# Patient Record
Sex: MALE | URBAN - METROPOLITAN AREA
[De-identification: names, ages, dates, MRNs, and addresses within clinical notes are randomized per-mention and may not be internally consistent; named-entity substitution may affect disease eponyms.]

---

## 2020-03-23 ENCOUNTER — EMERGENCY (EMERGENCY)
Facility: HOSPITAL | Age: 65
LOS: 1 days | Discharge: HOME | End: 2020-03-23
Attending: EMERGENCY MEDICINE | Admitting: EMERGENCY MEDICINE
Payer: SELF-PAY

## 2020-03-23 VITALS
SYSTOLIC BLOOD PRESSURE: 160 MMHG | DIASTOLIC BLOOD PRESSURE: 90 MMHG | OXYGEN SATURATION: 100 % | TEMPERATURE: 98 F | HEART RATE: 83 BPM | RESPIRATION RATE: 16 BRPM

## 2020-03-23 LAB
ALBUMIN SERPL ELPH-MCNC: 3.8 G/DL — SIGNIFICANT CHANGE UP (ref 3.3–5)
ALP SERPL-CCNC: 92 U/L — SIGNIFICANT CHANGE UP (ref 40–120)
ALT FLD-CCNC: 32 U/L — SIGNIFICANT CHANGE UP (ref 4–41)
ANION GAP SERPL CALC-SCNC: 12 MMO/L — SIGNIFICANT CHANGE UP (ref 7–14)
AST SERPL-CCNC: 32 U/L — SIGNIFICANT CHANGE UP (ref 4–40)
BASOPHILS # BLD AUTO: 0.06 K/UL — SIGNIFICANT CHANGE UP (ref 0–0.2)
BASOPHILS NFR BLD AUTO: 0.6 % — SIGNIFICANT CHANGE UP (ref 0–2)
BILIRUB SERPL-MCNC: 0.6 MG/DL — SIGNIFICANT CHANGE UP (ref 0.2–1.2)
BUN SERPL-MCNC: 16 MG/DL — SIGNIFICANT CHANGE UP (ref 7–23)
CALCIUM SERPL-MCNC: 9.1 MG/DL — SIGNIFICANT CHANGE UP (ref 8.4–10.5)
CHLORIDE SERPL-SCNC: 99 MMOL/L — SIGNIFICANT CHANGE UP (ref 98–107)
CO2 SERPL-SCNC: 23 MMOL/L — SIGNIFICANT CHANGE UP (ref 22–31)
CREAT SERPL-MCNC: 1 MG/DL — SIGNIFICANT CHANGE UP (ref 0.5–1.3)
EOSINOPHIL # BLD AUTO: 0.03 K/UL — SIGNIFICANT CHANGE UP (ref 0–0.5)
EOSINOPHIL NFR BLD AUTO: 0.3 % — SIGNIFICANT CHANGE UP (ref 0–6)
GLUCOSE SERPL-MCNC: 122 MG/DL — HIGH (ref 70–99)
HCT VFR BLD CALC: 40.8 % — SIGNIFICANT CHANGE UP (ref 39–50)
HGB BLD-MCNC: 13.3 G/DL — SIGNIFICANT CHANGE UP (ref 13–17)
IMM GRANULOCYTES NFR BLD AUTO: 0.6 % — SIGNIFICANT CHANGE UP (ref 0–1.5)
LYMPHOCYTES # BLD AUTO: 1.19 K/UL — SIGNIFICANT CHANGE UP (ref 1–3.3)
LYMPHOCYTES # BLD AUTO: 11.1 % — LOW (ref 13–44)
MCHC RBC-ENTMCNC: 27.7 PG — SIGNIFICANT CHANGE UP (ref 27–34)
MCHC RBC-ENTMCNC: 32.6 % — SIGNIFICANT CHANGE UP (ref 32–36)
MCV RBC AUTO: 84.8 FL — SIGNIFICANT CHANGE UP (ref 80–100)
MONOCYTES # BLD AUTO: 0.86 K/UL — SIGNIFICANT CHANGE UP (ref 0–0.9)
MONOCYTES NFR BLD AUTO: 8 % — SIGNIFICANT CHANGE UP (ref 2–14)
NEUTROPHILS # BLD AUTO: 8.5 K/UL — HIGH (ref 1.8–7.4)
NEUTROPHILS NFR BLD AUTO: 79.4 % — HIGH (ref 43–77)
NRBC # FLD: 0 K/UL — SIGNIFICANT CHANGE UP (ref 0–0)
PLATELET # BLD AUTO: 240 K/UL — SIGNIFICANT CHANGE UP (ref 150–400)
PMV BLD: 10.5 FL — SIGNIFICANT CHANGE UP (ref 7–13)
POTASSIUM SERPL-MCNC: 4.1 MMOL/L — SIGNIFICANT CHANGE UP (ref 3.5–5.3)
POTASSIUM SERPL-SCNC: 4.1 MMOL/L — SIGNIFICANT CHANGE UP (ref 3.5–5.3)
PROT SERPL-MCNC: 8.1 G/DL — SIGNIFICANT CHANGE UP (ref 6–8.3)
RBC # BLD: 4.81 M/UL — SIGNIFICANT CHANGE UP (ref 4.2–5.8)
RBC # FLD: 13.3 % — SIGNIFICANT CHANGE UP (ref 10.3–14.5)
SODIUM SERPL-SCNC: 134 MMOL/L — LOW (ref 135–145)
WBC # BLD: 10.7 K/UL — HIGH (ref 3.8–10.5)
WBC # FLD AUTO: 10.7 K/UL — HIGH (ref 3.8–10.5)

## 2020-03-23 PROCEDURE — 99284 EMERGENCY DEPT VISIT MOD MDM: CPT

## 2020-03-23 PROCEDURE — 73110 X-RAY EXAM OF WRIST: CPT | Mod: 26,LT

## 2020-03-23 RX ORDER — ACETAMINOPHEN 500 MG
650 TABLET ORAL ONCE
Refills: 0 | Status: COMPLETED | OUTPATIENT
Start: 2020-03-23 | End: 2020-03-23

## 2020-03-23 RX ORDER — AZTREONAM 2 G
1 VIAL (EA) INJECTION
Qty: 14 | Refills: 0
Start: 2020-03-23 | End: 2020-03-29

## 2020-03-23 RX ADMIN — Medication 650 MILLIGRAM(S): at 18:20

## 2020-03-23 RX ADMIN — Medication 100 MILLIGRAM(S): at 18:20

## 2020-03-23 NOTE — ED PROVIDER NOTE - OBJECTIVE STATEMENT
64 yo M PMHx HLD, presents to ED c/o left wrist swelling, redness, and pain after he was bitten by his pet cat about 5 days ago (3/18/20). Two days later, (3/20), pt went to  and was Rx'd Amoxicillin, which he has been taking. Pt presents today for worsening of swelling and increased redness. Pt states there was minimal spnotaneous purulent drainage from the left wrist. Pt UTD w/ tetanus (received about 6 months ago). Pt denies cough, fever, chills, n/v/d, abd pain, sob, sick contacts, covid+ close contacts, recent travel, sore throat, rhinorrhea. 66 yo M PMHx HLD, presents to ED c/o left wrist swelling, redness, and pain after he was bitten by his pet cat about 5 days ago (3/18/20). cat not utd w/ shots but has been their pet for many years and since it was a kitten. Two days later, (3/20), pt went to  and was Rx'd Augmentin, which he has been taking. Pt presents today for worsening of swelling and increased redness. Pt states there was minimal spnotaneous purulent drainage from the left wrist. Pt UTD w/ tetanus (received about 6 months ago). Pt denies cough, fever, chills, n/v/d, abd pain, sob, sick contacts, covid+ close contacts, recent travel, sore throat, rhinorrhea.

## 2020-03-23 NOTE — ED PROVIDER NOTE - NSFOLLOWUPINSTRUCTIONS_ED_ALL_ED_FT
Please take Acetaminophen over the counter. Use as directed and see medication warnings.  Please take Ibuprofen over the counter. Use as directed and see medication warnings.  Please follow up with your primary care physician.  Please bring a copy of your results with you.  Please return to the emergency department for worsening of your symptoms.     A prescription for antibiotics was sent to your pharmacy.  Please take Clindamycin 450 mg every 8 hours for 7 days.  Please take Bactrim DS 1 tablet every 12 hours for 7 days.

## 2020-03-23 NOTE — ED PROVIDER NOTE - CLINICAL SUMMARY MEDICAL DECISION MAKING FREE TEXT BOX
Joni Wyatt MD. pt with left wrist pain/swelling x6 days after cat bite. has been on amoxicillin PO for past 4 days but without improvement. afebrile. non diabetic. non toxic appearing. exam and vitals as above. will obtain xray, iv clinda, cbc, cmp.

## 2020-03-23 NOTE — ED PROVIDER NOTE - PATIENT PORTAL LINK FT
You can access the FollowMyHealth Patient Portal offered by [facility] by registering at the following website: [email or web address]. By joining [email or web address]’s FollowMyHealth portal, you will also be able to view your health information using other applications (apps) compatible with our system.

## 2020-03-23 NOTE — ED PROVIDER NOTE - PROGRESS NOTE DETAILS
Joni Wyatt MD. given Joni Wyatt MD. marked pt's erythema with surgicla marker. no obs/cdu unit. pt given strict return precautions including but not limtied to fever, worsenign of swelling/pain, spreading of erythema, skin changes, nausea, vomiting, and/or diarrhea. all questions answered. pt to be discharged. Cash PGY3: discussed with patient strict return precautions, patient endorsed understanding, informed him to return for any streaking, severe pain, worsening area of redness, crepitus, or any fevers or difficulty moving wrist, as well as paresthesias, pt left prior to registration, however abx were able to be called in to his pharmacy by Dr Wyatt.

## 2020-03-23 NOTE — ED ADULT NURSE NOTE - NSIMPLEMENTINTERV_GEN_ALL_ED
Implemented All Universal Safety Interventions:  [place] to call system. Call bell, personal items and telephone within reach. Instruct patient to call for assistance. Room bathroom lighting operational. Non-slip footwear when patient is off stretcher. Physically safe environment: no spills, clutter or unnecessary equipment. Stretcher in lowest position, wheels locked, appropriate side rails in place.

## 2020-03-23 NOTE — ED ADULT NURSE NOTE - OBJECTIVE STATEMENT
pt received spot 22a. pt A+Ox4 c/o cat bite to left wrist x 6 days ago. states was started on oral antibiotics with no improvement. left hand/arm appears red and edematous. denies fever/chills. vs as noted. labs sent. IVSL in place. medicated as ordered. will monitor.

## 2020-03-23 NOTE — ED PROVIDER NOTE - NS ED ROS FT
Constitutional: no fevers or chills  HEENT: no visual changes, no sore throat, no rhinorrhea  CV: no cp or palpitations  Resp: no sob or cough  GI: no abd pain, no nausea, no vomiting, no diarrhea, no constipation  : no dysuria, no hematuria  MSK: L wrist pain and swelling;   skin: no rashes  neuro: no HA, no numbness; no weakness, no tingling  ROS statement: all other ROS negative except as per HPI

## 2020-03-23 NOTE — ED PROVIDER NOTE - PHYSICAL EXAMINATION
PHYSICAL EXAM:  GENERAL: non-toxic appearing; in no respiratory distress  HEAD: Atraumatic, Normocephalic;  NECK: No JVD; FROM  EYES: PERRL, EOMs intact b/l w/out deficits  CHEST/LUNG: CTAB no wheezes/rhonchi/rales  HEART: RRR no murmur/gallops/rubs  ABDOMEN: +BS, soft, NT, ND  EXTREMITIES: No LE edema, +2 radial pulses b/l  MUSCULOSKELETAL: Limited ROM of wrist and unable to fully make a fist due to swelling of the left wrist; clear puncture marks on L wrist without purulent drainage; there is erythema, fluctuance but no induration;   NERVOUS SYSTEM:  A&Ox3, No motor deficits or sensory deficits; CNII-XII intact; no focal neurologic deficits  SKIN:  No new rashes

## 2020-03-23 NOTE — ED ADULT NURSE NOTE - CHIEF COMPLAINT QUOTE
pt was bitten by cat on Wed pt L hand is red and swollen. pt states its his house cat and never got shots. pt denies medical problems. pt denies cough fever or SOB pt VSS NAD
